# Patient Record
Sex: MALE | Race: WHITE
[De-identification: names, ages, dates, MRNs, and addresses within clinical notes are randomized per-mention and may not be internally consistent; named-entity substitution may affect disease eponyms.]

---

## 2020-06-08 ENCOUNTER — HOSPITAL ENCOUNTER (INPATIENT)
Dept: HOSPITAL 83 - ED | Age: 45
LOS: 2 days | Discharge: HOME | DRG: 854 | End: 2020-06-10
Attending: INTERNAL MEDICINE | Admitting: INTERNAL MEDICINE
Payer: COMMERCIAL

## 2020-06-08 VITALS — DIASTOLIC BLOOD PRESSURE: 64 MMHG | SYSTOLIC BLOOD PRESSURE: 112 MMHG

## 2020-06-08 VITALS — DIASTOLIC BLOOD PRESSURE: 61 MMHG | SYSTOLIC BLOOD PRESSURE: 113 MMHG

## 2020-06-08 VITALS — WEIGHT: 242.31 LBS | HEIGHT: 71.97 IN | BODY MASS INDEX: 32.82 KG/M2

## 2020-06-08 VITALS — SYSTOLIC BLOOD PRESSURE: 104 MMHG | DIASTOLIC BLOOD PRESSURE: 34 MMHG

## 2020-06-08 VITALS — DIASTOLIC BLOOD PRESSURE: 71 MMHG

## 2020-06-08 VITALS — DIASTOLIC BLOOD PRESSURE: 57 MMHG

## 2020-06-08 VITALS — DIASTOLIC BLOOD PRESSURE: 50 MMHG

## 2020-06-08 VITALS — SYSTOLIC BLOOD PRESSURE: 110 MMHG | DIASTOLIC BLOOD PRESSURE: 70 MMHG

## 2020-06-08 DIAGNOSIS — K20.9: ICD-10-CM

## 2020-06-08 DIAGNOSIS — Z82.49: ICD-10-CM

## 2020-06-08 DIAGNOSIS — M19.90: ICD-10-CM

## 2020-06-08 DIAGNOSIS — K80.00: ICD-10-CM

## 2020-06-08 DIAGNOSIS — Z84.89: ICD-10-CM

## 2020-06-08 DIAGNOSIS — R65.20: ICD-10-CM

## 2020-06-08 DIAGNOSIS — N42.89: ICD-10-CM

## 2020-06-08 DIAGNOSIS — E87.8: ICD-10-CM

## 2020-06-08 DIAGNOSIS — K44.9: ICD-10-CM

## 2020-06-08 DIAGNOSIS — A41.9: Primary | ICD-10-CM

## 2020-06-08 DIAGNOSIS — E66.9: ICD-10-CM

## 2020-06-08 DIAGNOSIS — Z87.891: ICD-10-CM

## 2020-06-08 DIAGNOSIS — R73.9: ICD-10-CM

## 2020-06-08 DIAGNOSIS — Z71.6: ICD-10-CM

## 2020-06-08 LAB
ALBUMIN SERPL-MCNC: 3.9 GM/DL (ref 3.1–4.5)
ALP SERPL-CCNC: 93 U/L (ref 45–117)
ALT SERPL W P-5'-P-CCNC: 24 U/L (ref 12–78)
APPEARANCE UR: (no result)
APTT PPP: 26.5 SECONDS (ref 20–32.1)
AST SERPL-CCNC: 9 IU/L (ref 3–35)
BACTERIA #/AREA URNS HPF: (no result) /[HPF]
BASOPHILS # BLD AUTO: 0.1 10*3/UL (ref 0–0.1)
BASOPHILS NFR BLD AUTO: 0.6 % (ref 0–1)
BILIRUB UR QL STRIP: NEGATIVE
BUN SERPL-MCNC: 12 MG/DL (ref 7–24)
CHLORIDE SERPL-SCNC: 109 MMOL/L (ref 98–107)
COLOR UR: YELLOW
CREAT SERPL-MCNC: 0.88 MG/DL (ref 0.7–1.3)
EOSINOPHIL # BLD AUTO: 0.1 10*3/UL (ref 0–0.4)
EOSINOPHIL # BLD AUTO: 0.7 % (ref 1–4)
EPI CELLS #/AREA URNS HPF: (no result) /[HPF]
ERYTHROCYTE [DISTWIDTH] IN BLOOD BY AUTOMATED COUNT: 13.4 % (ref 0–14.5)
GLUCOSE UR QL: NEGATIVE
HCT VFR BLD AUTO: 51.2 % (ref 42–52)
HGB UR QL STRIP: NEGATIVE
INR BLD: 1 (ref 2–3.5)
KETONES UR QL STRIP: NEGATIVE
LEUKOCYTE ESTERASE UR QL STRIP: NEGATIVE
LIPASE SERPL-CCNC: 105 U/L (ref 73–393)
LYMPHOCYTES # BLD AUTO: 2.5 10*3/UL (ref 1.3–4.4)
LYMPHOCYTES NFR BLD AUTO: 14.3 % (ref 27–41)
MCH RBC QN AUTO: 31.1 PG (ref 27–31)
MCHC RBC AUTO-ENTMCNC: 33 G/DL (ref 33–37)
MCV RBC AUTO: 94.1 FL (ref 80–94)
MONOCYTES # BLD AUTO: 0.8 10*3/UL (ref 0.1–1)
MONOCYTES NFR BLD MANUAL: 4.5 % (ref 3–9)
MUCOUS THREADS URNS QL MICRO: (no result)
NEUT #: 14 10*3/UL (ref 2.3–7.9)
NEUT %: 79.4 % (ref 47–73)
NITRITE UR QL STRIP: NEGATIVE
NRBC BLD QL AUTO: 0 10*3/UL (ref 0–0)
PH UR STRIP: 7.5 [PH] (ref 5–9)
PLATELET # BLD AUTO: 253 10*3/UL (ref 130–400)
PMV BLD AUTO: 11.1 FL (ref 9.6–12.3)
POTASSIUM SERPL-SCNC: 3.5 MMOL/L (ref 3.5–5.1)
PROT SERPL-MCNC: 7.8 GM/DL (ref 6.4–8.2)
RBC # BLD AUTO: 5.44 10*6/UL (ref 4.5–5.9)
RBC #/AREA URNS HPF: (no result) RBC/HPF (ref 0–2)
SODIUM SERPL-SCNC: 141 MMOL/L (ref 136–145)
SP GR UR: 1 (ref 1–1.03)
UROBILINOGEN UR STRIP-MCNC: 0.2 E.U./DL (ref 0.2–1)
WBC #/AREA URNS HPF: (no result) WBC/HPF (ref 0–5)
WBC NRBC COR # BLD AUTO: 17.6 10*3/UL (ref 4.8–10.8)

## 2020-06-08 NOTE — NUR
PATIENT IS AAOX3 RESTING IN BED WITH EASY AND REGULAR RESPERS ON ROOM AIR.
ASSESSMENT IS COMPLETE. PATIENT C/O PRESSURE IN ABDOMEN AND DOESNT WANT ANY
PAIN MEDICATION. BED IS LOW, LOCKED, AND CALL LIGHT IS WITHIN REACH. WILL
CONTINUE TO MONITOR, SEE INTERVENTION SCREEN.

## 2020-06-08 NOTE — NUR
PATIENT RESTING COMFORTABLY IN BED, NO S/S OF DISTRESS NOTED, ANTIBIOTIC STILL
INFUSING WITHOUT DIFFICULTY. WILL CONTINUE TO MONITOR PATIENT.

## 2020-06-09 VITALS — SYSTOLIC BLOOD PRESSURE: 118 MMHG | DIASTOLIC BLOOD PRESSURE: 58 MMHG

## 2020-06-09 VITALS — DIASTOLIC BLOOD PRESSURE: 41 MMHG | SYSTOLIC BLOOD PRESSURE: 101 MMHG

## 2020-06-09 VITALS — DIASTOLIC BLOOD PRESSURE: 56 MMHG | SYSTOLIC BLOOD PRESSURE: 110 MMHG

## 2020-06-09 VITALS — DIASTOLIC BLOOD PRESSURE: 52 MMHG

## 2020-06-09 VITALS — DIASTOLIC BLOOD PRESSURE: 64 MMHG

## 2020-06-09 VITALS — DIASTOLIC BLOOD PRESSURE: 62 MMHG

## 2020-06-09 VITALS — DIASTOLIC BLOOD PRESSURE: 49 MMHG

## 2020-06-09 VITALS — DIASTOLIC BLOOD PRESSURE: 53 MMHG

## 2020-06-09 VITALS — DIASTOLIC BLOOD PRESSURE: 58 MMHG

## 2020-06-09 LAB
25(OH)D3 SERPL-MCNC: 27 NG/ML (ref 30–100)
ALBUMIN SERPL-MCNC: 3.1 GM/DL (ref 3.1–4.5)
ALP SERPL-CCNC: 82 U/L (ref 45–117)
ALT SERPL W P-5'-P-CCNC: 17 U/L (ref 12–78)
AST SERPL-CCNC: 12 IU/L (ref 3–35)
BASOPHILS # BLD AUTO: 0 10*3/UL (ref 0–0.1)
BASOPHILS NFR BLD AUTO: 0.3 % (ref 0–1)
BUN SERPL-MCNC: 7 MG/DL (ref 7–24)
CHLORIDE SERPL-SCNC: 109 MMOL/L (ref 98–107)
CHOLEST SERPL-MCNC: 107 MG/DL (ref ?–200)
CREAT SERPL-MCNC: 0.75 MG/DL (ref 0.7–1.3)
EOSINOPHIL # BLD AUTO: 0.1 10*3/UL (ref 0–0.4)
EOSINOPHIL # BLD AUTO: 1 % (ref 1–4)
ERYTHROCYTE [DISTWIDTH] IN BLOOD BY AUTOMATED COUNT: 13.6 % (ref 0–14.5)
HCT VFR BLD AUTO: 43 % (ref 42–52)
HDLC SERPL-MCNC: 32 MG/DL (ref 40–60)
LDLC SERPL DIRECT ASSAY-MCNC: 55 MG/DL (ref 9–159)
LYMPHOCYTES # BLD AUTO: 2.2 10*3/UL (ref 1.3–4.4)
LYMPHOCYTES NFR BLD AUTO: 16.4 % (ref 27–41)
MCH RBC QN AUTO: 30.8 PG (ref 27–31)
MCHC RBC AUTO-ENTMCNC: 33.5 G/DL (ref 33–37)
MCV RBC AUTO: 91.9 FL (ref 80–94)
MONOCYTES # BLD AUTO: 1.3 10*3/UL (ref 0.1–1)
MONOCYTES NFR BLD MANUAL: 9.8 % (ref 3–9)
NEUT #: 9.9 10*3/UL (ref 2.3–7.9)
NEUT %: 72.1 % (ref 47–73)
NRBC BLD QL AUTO: 0 10*3/UL (ref 0–0)
PLATELET # BLD AUTO: 199 10*3/UL (ref 130–400)
PMV BLD AUTO: 11.8 FL (ref 9.6–12.3)
POTASSIUM SERPL-SCNC: 3.2 MMOL/L (ref 3.5–5.1)
PROT SERPL-MCNC: 6.5 GM/DL (ref 6.4–8.2)
RBC # BLD AUTO: 4.68 10*6/UL (ref 4.5–5.9)
SODIUM SERPL-SCNC: 137 MMOL/L (ref 136–145)
T4 FREE SERPL-MCNC: 1.16 NG/DL (ref 0.76–1.46)
TRIGL SERPL-MCNC: 99 MG/DL (ref ?–150)
TSH SERPL DL<=0.005 MIU/L-ACNC: 0.84 UIU/ML (ref 0.36–4.75)
VITAMIN B12: 786 PG/ML (ref 247–911)
VLDLC SERPL CALC-MCNC: 20 MG/DL (ref 6–40)
WBC NRBC COR # BLD AUTO: 13.7 10*3/UL (ref 4.8–10.8)

## 2020-06-09 PROCEDURE — 0FT44ZZ RESECTION OF GALLBLADDER, PERCUTANEOUS ENDOSCOPIC APPROACH: ICD-10-PCS | Performed by: SURGERY

## 2020-06-09 NOTE — NUR
DR DUNN AWARE OF PATIENT'S PULSE OX 91% ON ROOM AIR WITH DEEP BREATHING.
PATIENT STATES IT HURTS HIS ABDOMEN TO BREATH. REFUSING PAIN MEDICATION. 2L NC
APPLIED.

## 2020-06-09 NOTE — NUR
in to talk to patient.
Patient states lives at HOME with WIFE.
There are NO steps in the home.
Physician: NONE
Pharmacy: [facility]
Home health services: NONE
Patient's level of ADLs: INDEPENDENT
Patient has working utilities: YES
DME: NONE
Follow-up physician's appointment after d/c: WILL FIND ONE POST DISCHARGE
Does patient want to access PORTAL?: NO
Discharge plan . PT LIVES AT HOME WITH HIS WIFE AND IS INDEPENDENT IN HIS
CARE. DENIES HE WILL HAVE ANY NEEDS ON DISCHARGE.  PLAN IS TO RETURN HOME WHEN
MEDICALLY STABLE.  WILL CONTINUE TO FOLLOW.  STATES HE WILL HAVE A RIDE HOME.
 
 
LONG,NISHA

## 2020-06-09 NOTE — NUR
PATIENT RESTING IN BED WITH NO NEEDS MADE. DENIES PAIN. FLUIDS INFUSING PER
ORDER. BED IN LOWEST POSITION, CALL LIGHT IN REACH

## 2020-06-09 NOTE — NUR
PATIENT ENCOURAGED TO AMBULATE AND DEEP BREATH. CONTINUES TO REFUSE ANY PAIN
MEDICATION AT THIS TIME. LUNG SOUNDS CLEAR/DIMINISHED. WILL CONTINUE TO
MONITOR

## 2020-06-10 VITALS — DIASTOLIC BLOOD PRESSURE: 60 MMHG

## 2020-06-10 VITALS — DIASTOLIC BLOOD PRESSURE: 53 MMHG

## 2020-06-10 LAB
BASOPHILS # BLD AUTO: 0 10*3/UL (ref 0–0.1)
BASOPHILS NFR BLD AUTO: 0.2 % (ref 0–1)
BUN SERPL-MCNC: 10 MG/DL (ref 7–24)
CHLORIDE SERPL-SCNC: 112 MMOL/L (ref 98–107)
CREAT SERPL-MCNC: 0.62 MG/DL (ref 0.7–1.3)
EOSINOPHIL # BLD AUTO: 0.1 10*3/UL (ref 0–0.4)
EOSINOPHIL # BLD AUTO: 0.3 % (ref 1–4)
ERYTHROCYTE [DISTWIDTH] IN BLOOD BY AUTOMATED COUNT: 13.5 % (ref 0–14.5)
HCT VFR BLD AUTO: 39.1 % (ref 42–52)
LYMPHOCYTES # BLD AUTO: 2.2 10*3/UL (ref 1.3–4.4)
LYMPHOCYTES NFR BLD AUTO: 14.5 % (ref 27–41)
MCH RBC QN AUTO: 31.1 PG (ref 27–31)
MCHC RBC AUTO-ENTMCNC: 33.8 G/DL (ref 33–37)
MCV RBC AUTO: 92.2 FL (ref 80–94)
MONOCYTES # BLD AUTO: 1.1 10*3/UL (ref 0.1–1)
MONOCYTES NFR BLD MANUAL: 7.4 % (ref 3–9)
NEUT #: 11.8 10*3/UL (ref 2.3–7.9)
NEUT %: 77.2 % (ref 47–73)
NRBC BLD QL AUTO: 0 % (ref 0–0)
PLATELET # BLD AUTO: 161 10*3/UL (ref 130–400)
PMV BLD AUTO: 11 FL (ref 9.6–12.3)
POTASSIUM SERPL-SCNC: 3.6 MMOL/L (ref 3.5–5.1)
RBC # BLD AUTO: 4.24 10*6/UL (ref 4.5–5.9)
SODIUM SERPL-SCNC: 140 MMOL/L (ref 136–145)
WBC NRBC COR # BLD AUTO: 15.2 10*3/UL (ref 4.8–10.8)

## 2020-06-10 NOTE — NUR
PT DISCHARGED HOME AT THIS TIME. FOLLOW UP CARE DISCUSSED. DISCHARGE
INSTRUCTIONS GIVEN TO PT. MICHAEL REMOVED.
